# Patient Record
Sex: FEMALE | Race: WHITE | NOT HISPANIC OR LATINO | Employment: FULL TIME | URBAN - METROPOLITAN AREA
[De-identification: names, ages, dates, MRNs, and addresses within clinical notes are randomized per-mention and may not be internally consistent; named-entity substitution may affect disease eponyms.]

---

## 2018-07-02 ENCOUNTER — OFFICE VISIT (OUTPATIENT)
Dept: ORTHOPEDIC SURGERY | Facility: CLINIC | Age: 43
End: 2018-07-02

## 2018-07-02 VITALS — HEIGHT: 59 IN | BODY MASS INDEX: 32 KG/M2 | HEART RATE: 122 BPM | WEIGHT: 158.73 LBS | OXYGEN SATURATION: 98 %
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
## 2018-07-02 DIAGNOSIS — S92.354A CLOSED NONDISPLACED FRACTURE OF FIFTH METATARSAL BONE OF RIGHT FOOT, INITIAL ENCOUNTER: Primary | ICD-10-CM

## 2018-07-02 PROCEDURE — 99203 OFFICE O/P NEW LOW 30 MIN: CPT | Performed by: PHYSICIAN ASSISTANT

## 2018-07-02 RX ORDER — TIZANIDINE 4 MG/1
TABLET ORAL
Refills: 0 | COMMUNITY
Start: 2018-04-10 | End: 2018-07-02

## 2018-07-02 NOTE — PROGRESS NOTES
[facility] [place] [facility] Note    Subjective     Pain and Edema of the Right Foot      HPI    Claribel Phillips is a 43 y.o. female. Patient presents to orthopedic clinic for evaluation of right foot pain.  States that she fell last Thursday (6/28/18).  Secondary to pain, she presented to urgent care the next day, where she was diagnosed with a fifth metatarsal fracture, placed in a short leg boot with crutches and referred to orthopedics for further evaluation and treatment.  Patient stated that when she was 8 or 9 years old she did have a right distal fibular fracture that was casted.  No sequelae following its treatment.  She denies any numbness or tingling into the extremity.  Currently reports pain scale 8/10.  Severity of pain moderate to severe.  Quality pain aching, burning, sharp, associated symptoms swelling.  Worsens with walking, stair climbing.  She denies any fever, chills, night sweats or other constitutional symptoms.  Using Tylenol for pain control is not helping.  Unable to take anti-inflammatories secondary to history of bariatric surgery.    Past Medical History:   Diagnosis Date   • Pancreatitis       Past Surgical History:   Procedure Laterality Date   • ABDOMINAL SURGERY      tummy tuck   • APPENDECTOMY     • CHOLECYSTECTOMY     • GASTRIC BYPASS     • HYSTERECTOMY     • TONSILLECTOMY        History reviewed. No pertinent family history.  Social History     Social History   • Marital status:      Spouse name: N/A   • Number of children: N/A   • Years of education: N/A     Occupational History   • Not on file.     Social History Main Topics   • Smoking status: Never Smoker   • Smokeless tobacco: Never Used   • Alcohol use No   • Drug use: No   • Sexual activity: Defer     Other Topics Concern   • Not on file     Social History Narrative   • No narrative on file      Current Outpatient Prescriptions on File Prior to Visit   Medication Sig Dispense Refill   • topiramate (TOPAMAX)  "25 MG capsule Take 25 mg by mouth 2 (Two) Times a Day.     • venlafaxine (EFFEXOR) 25 MG tablet Take 25 mg by mouth 2 (Two) Times a Day.       No current facility-administered medications on file prior to visit.       Allergies   Allergen Reactions   • Morphine And Related Itching     IV Morphine   • Nsaids GI Intolerance     History of gastric Bypass, unable to have NSAIDS        The following portions of the patient's history were reviewed and updated as appropriate: allergies, current medications, past family history, past medical history, past social history, past surgical history and problem list.    Review of Systems   Constitutional: Negative.    HENT: Negative.    Eyes: Negative.    Respiratory: Negative.    Cardiovascular: Negative.    Gastrointestinal: Negative.    Endocrine: Negative.    Genitourinary: Negative.    Musculoskeletal: Positive for arthralgias, gait problem and joint swelling.   Skin: Negative.    Allergic/Immunologic: Negative.    Hematological: Negative.    Psychiatric/Behavioral: Negative.         Objective      Physical Exam  Pulse (!) 122   Ht 149.9 cm (59\")   Wt 72 kg (158 lb 11.7 oz)   SpO2 98%   BMI 32.06 kg/m²     Body mass index is 32.06 kg/m².    General  Mental Status - alert  General Appearance - cooperative, well groomed, not in acute distress  Orientation - Oriented X3  Build & Nutrition - well developed and well nourished  Posture - normal posture  Gait - Antalgic gait, using crutches     Integumentary  Global Assessment  Examination of related systems reveals - no lymphadenopathy  General Characteristics  Overall examination of the patient's skin reveals - no rashes, no evidence of scars, no suspicious lesions and no bruises.  Color - normal coloration of skin except at right foot and ankle there is ecchymosis mostly along the lateral border of the foot and ankle.    Ortho Exam  Peripheral Vascular:  Lower Extremity:  Inspection:  Right--rapid capillary refill  Palpation: "  Dorsalis pedis pulse:   Right--normal    Neurologic  Sensory:    Light Touch:     Right foot:  Dorsal intact and plantar intact     Overall Assessment of Muscle Strength and Tone:  Lower Extremities:     Right:  Tibialis anterior--5/5    Gastroc soleus--5/5    EHL--5/5    FHL--5/5    Musculoskeletal  Lower Extremity  Ankle/Foot:  Inspection and Palpation:     Right:  Tenderness: Along the fifth metatarsal, lateral and dorsal foot    Swelling: Mild    Effusion:  None    Crepitus:  None     ROM:   Right:  Plantarflexion--30    Dorsiflexion--10    Inversion--5    Eversion--5    Instability:     Right:  Anterior drawer test--negative    Squeeze test--negative    Talar tilt test--negative      Imaging/Studies  Imaging Results (last 24 hours)     ** No results found for the last 24 hours. **      Reviewed plain film x-rays taken on 6/29/18.  Nondisplaced fracture noted proximal fifth metatarsal.  See chart for official report.    Assessment:  1. Closed nondisplaced fracture of fifth metatarsal bone of right foot, initial encounter    2. Fall, initial encounter        Plan:  1. Discussion was had with the patient regarding exam, imaging, assessment and treatment options.  2. Will place her in a right nonweightbearing short leg fiberglass cast today.  Reviewed cast precautions.  3. Discussed the importance that he stay nonweightbearing for use of her crutches but I also ordered her a knee scooter to help assist with this.  4. Continue use of her over-the-counter Tylenol however this doesn't provide enough pain relief she can contact her PCP for stronger medicine.  5. Note was given for work: Off today, needs to use crutches or scooter the rest of the time to remain.  6. Follow-up in 3 weeks for repeat evaluation this will include pre-clinic imaging of the right foot cast.  Return sooner if issues arise or symptoms change.  7. Questions and concerns answered.      Medical Decision Making  Management Options :  over-the-counter medicine and close treatment of fracture or dislocation  Data/Risk: radiology tests       Zoraida Singh PA-C  07/02/18  11:58 AM

## 2018-07-12 ENCOUNTER — TELEPHONE (OUTPATIENT)
Dept: ORTHOPEDIC SURGERY | Facility: CLINIC | Age: 43
End: 2018-07-12

## 2018-07-12 RX ORDER — HYDROCODONE BITARTRATE AND ACETAMINOPHEN 5; 325 MG/1; MG/1
1 TABLET ORAL EVERY 6 HOURS PRN
Qty: 30 TABLET | Refills: 0 | Status: SHIPPED | OUTPATIENT
Start: 2018-07-12

## 2018-07-12 NOTE — TELEPHONE ENCOUNTER
Please prescribe 30 tablets 5/325 Hydrocodone/acetaminophen for this patient. S/p R 5MT fracture.      Anamaria to contact patient.  No further hydrocodone after she goes to this prescription.

## 2018-07-12 NOTE — TELEPHONE ENCOUNTER
Patient saw her PCP for pain and was prescribed hydrocodone. She states she is out and her PCP said the patient needed to follow up with Ortho to get any more scripts for pain medication. She would like another script. Please call and advise.

## 2018-07-23 ENCOUNTER — OFFICE VISIT (OUTPATIENT)
Dept: ORTHOPEDIC SURGERY | Facility: CLINIC | Age: 43
End: 2018-07-23

## 2018-07-23 ENCOUNTER — TELEPHONE (OUTPATIENT)
Dept: ORTHOPEDIC SURGERY | Facility: CLINIC | Age: 43
End: 2018-07-23

## 2018-07-23 VITALS — HEIGHT: 59 IN | OXYGEN SATURATION: 99 % | WEIGHT: 158.73 LBS | HEART RATE: 94 BPM | BODY MASS INDEX: 32 KG/M2

## 2018-07-23 DIAGNOSIS — S92.354D CLOSED NONDISPLACED FRACTURE OF FIFTH METATARSAL BONE OF RIGHT FOOT WITH ROUTINE HEALING, SUBSEQUENT ENCOUNTER: Primary | ICD-10-CM

## 2018-07-23 PROCEDURE — 99213 OFFICE O/P EST LOW 20 MIN: CPT | Performed by: PHYSICIAN ASSISTANT

## 2018-07-23 PROCEDURE — 29405 APPL SHORT LEG CAST: CPT | Performed by: PHYSICIAN ASSISTANT

## 2018-07-23 NOTE — PROGRESS NOTES
"    [facility] – [place] [facility] Note    Subjective     CC: Follow-up of the Right Foot (3 weeks Closed nondisplaced fracture of fifth metatarsal bone of right foot)      MARTHA Phillips is a 43 y.o. female. Patient returns today 3-1/2 weeks status post a closed nondisplaced fracture of fifth metatarsal on the right foot.  She's been in a cast during this time.  She is using a knee scooter to remain nonweightbearing.  She currently reports her pain scale maximum 4/10.  Severity the pain moderate.  Quality pain aching and burning.  She denies any numbness or tingling into the extremity.  She was given a prescription of Wellman on 12 July for pain control management.  No other new complaints or issues at this time.      ROS:    Constiutional:Pt denies fever, chills, nausea, or vomiting.  MSK:as above    Objective      Past Medical History  Past Medical History:   Diagnosis Date   • Pancreatitis          Physical Exam  Pulse 94   Ht 149.9 cm (59.02\")   Wt 72 kg (158 lb 11.7 oz)   SpO2 99%   BMI 32.04 kg/m²     Body mass index is 32.04 kg/m².    Patient is well nourished and well developed.        Ortho Exam  Peripheral Vascular:  Lower Extremity:  Inspection:  Right--rapid capillary refill  Palpation:  Dorsalis pedis pulse:   Right--normal    Neurologic  Sensory:    Light Touch:     Right foot:  Dorsal intact and plantar intact       Overall Assessment of Muscle Strength and Tone:  Lower Extremities:     Right:  Tibialis anterior--demonstrates firing    Gastroc soleus--demonstrates firing    EHL--demonstrates firing    FHL--demonstrates firing      Musculoskeletal  Lower Extremity  Ankle/Foot:  Inspection and Palpation:     Right:  Tenderness: Base of fifth metatarsal    Swelling: Mild    Effusion:  None    Crepitus:  None       ROM:   Right:  Plantarflexion--30    Dorsiflexion--10    Inversion--5    Eversion--5      Imaging/Labs/EMG Reviewed:  Imaging Results (last 24 hours)     ** No results found for the " last 24 hours. **      Ordered plain films of the right foot today.  Images reviewed by Dr. Goetz.  Healing of a fifth metatarsal base fracture.  Joint spaces are preserved.  No abnormal soft tissue findings.  See chart for official report.    Assessment:  1. Closed nondisplaced fracture of fifth metatarsal bone of right foot with routine healing, subsequent encounter        Plan:  1. Discussion was had with patient regarding exam, imaging, assessment and treatment options.    2. Based on the amount of pain that she's having with palpation over the area will continue a short leg cast for an additional 2 weeks.  She was placed in a new short leg fiberglass cast today.  Once again reviewed cast precautions with her.  3. She'll continue use of her knee scooter to remain nonweightbearing.    4. Continue use of previously prescribed medication for pain control.  5. Follow-up in 2 weeks for repeat evaluation to include pre-clinic imaging of the right foot out of the cast.  Return sooner if issues arise or symptoms change.  6. Question and concerns answered.          Medical Decision Making  Management Options : over-the-counter medicine and close treatment of fracture or dislocation  Data/Risk: radiology tests       Zoraida Singh PA-C  07/24/18  12:18 PM

## 2018-07-24 RX ORDER — TRAMADOL HYDROCHLORIDE 50 MG/1
50 TABLET ORAL EVERY 4 HOURS PRN
Qty: 30 TABLET | Refills: 0 | Status: SHIPPED | OUTPATIENT
Start: 2018-07-24

## 2018-07-24 NOTE — TELEPHONE ENCOUNTER
Can no longer prescribed Norco.  She was prescribed tramadol 50mg, 30 tablets.  Dr. Jackson has sent this to her pharmacy.  After this, no further narcotics for pain management will be prescribed by our clinic.  She'll need to use acetaminophen/Tylenol.  If she does require something stronger, then she'll have to go through PCP for pain management.

## 2018-08-07 ENCOUNTER — OFFICE VISIT (OUTPATIENT)
Dept: ORTHOPEDIC SURGERY | Facility: CLINIC | Age: 43
End: 2018-08-07

## 2018-08-07 VITALS — HEIGHT: 59 IN | HEART RATE: 91 BPM | WEIGHT: 158.73 LBS | OXYGEN SATURATION: 99 % | BODY MASS INDEX: 32 KG/M2

## 2018-08-07 DIAGNOSIS — S92.354D CLOSED NONDISPLACED FRACTURE OF FIFTH METATARSAL BONE OF RIGHT FOOT WITH ROUTINE HEALING, SUBSEQUENT ENCOUNTER: Primary | ICD-10-CM

## 2018-08-07 PROCEDURE — 99212 OFFICE O/P EST SF 10 MIN: CPT | Performed by: PHYSICIAN ASSISTANT

## 2018-08-07 NOTE — PROGRESS NOTES
"    [facility] [place] [facility]    Subjective     Follow-up of the Right Foot (2 week follow up, Closed nondisplaced fracture of fifth metatarsal bone of right foot )       MARTHA Phillips is a 43 y.o. female.  Patient returns today for right foot fifth metatarsal base fracture.  She's been treated in a cast for a period of 5 weeks.  She was nonweightbearing for majority that time her scooter was returned and she's been now on crutches and her cast does show some wear to the bottom.  She reports that her pain is improved from her last visit.  Pain scale maximum 2-3/10.  Severity the pain mild.  Quality the pain aching with occasional sharpness.  Associated symptoms stiffness.  No reported radiculopathy or paresthesias.  Patient denies any fever, chills, night sweats or other constitutional symptoms.            Objective      Physical Exam  Pulse 91   Ht 149.9 cm (59.02\")   Wt 72 kg (158 lb 11.7 oz)   SpO2 99%   BMI 32.04 kg/m²     Body mass index is 32.04 kg/m².        Ortho Exam  Peripheral Vascular:  Lower Extremity:  Inspection:  Right--rapid capillary refill  Palpation:  Dorsalis pedis pulse:   Right--normal     Neurologic  Sensory:    Light Touch:                Right foot:  Dorsal intact and plantar intact                   Overall Assessment of Muscle Strength and Tone:  Lower Extremities:                Right:  Tibialis anterior- 4-/5                          Gastroc soleus- 4-/5                          EHL--5/5                          FHL--5/5      Musculoskeletal  Lower Extremity  Ankle/Foot:  Inspection and Palpation:                Right:  Tenderness: Base of fifth metatarsal                          Swelling: Mild                          Effusion:  None                          Crepitus:  None        ROM:              Right:  Plantarflexion--30                          Dorsiflexion--10                          Inversion--5                          " Eversion--5      Imaging/Studies  Imaging Results (last 24 hours)     Procedure Component Value Units Date/Time    XR Foot 3+ View Right [[id number]] Resulted:  08/07/18 1134     Updated:  08/07/18 1134    Narrative:       Indication: Right base of fifth metatarsal fracture    Comparison: Todays xrays were compared to previous xrays from 7/23/18       Impression:            Right foot 3 views: Radiographs M straight interval consolidation of the   base of fifth metatarsal fracture.  No change in overall alignment.      Ordered plain film imaging today of the right foot.  Images were reviewed by Dr. Jackson.  Evidence of interval consolidation noted to the base of fifth metatarsal fracture.  No change in overall alignment when compared to previous films.  See chart for official report.    Assessment:  1. Closed nondisplaced fracture of fifth metatarsal bone of right foot with routine healing, subsequent encounter        Plan:  1. Discussion was had with patient regarding exam, imaging, assessment and treatment options.  2. She'll transition today back to her boot.  3. I would still like her to remain nonweightbearing for at least another 2 weeks before resuming weightbearing in the boot.  4. May continue to use previous prescribed pain medication for pain control as needed.  If she doesn't need a prescribed medication then would recommend over-the-counter pain medication as needed.  5. Have her return to see me in 4 weeks for repeat evaluation to include pre-clinic imaging of the right foot out of the boot.  6. Question and concerns were answered.    Medical Decision Making  Management Options : over-the-counter medicine and close treatment of fracture or dislocation  Data/Risk: radiology tests       Zoraida Singh PA-C  08/07/18  3:51 PM

## 2018-09-04 ENCOUNTER — OFFICE VISIT (OUTPATIENT)
Dept: ORTHOPEDIC SURGERY | Facility: CLINIC | Age: 43
End: 2018-09-04

## 2018-09-04 DIAGNOSIS — M76.61 TENDONITIS, ACHILLES, RIGHT: ICD-10-CM

## 2018-09-04 DIAGNOSIS — S92.354D CLOSED NONDISPLACED FRACTURE OF FIFTH METATARSAL BONE OF RIGHT FOOT WITH ROUTINE HEALING, SUBSEQUENT ENCOUNTER: Primary | ICD-10-CM

## 2018-09-04 PROCEDURE — 99213 OFFICE O/P EST LOW 20 MIN: CPT | Performed by: PHYSICIAN ASSISTANT

## 2018-09-04 NOTE — PROGRESS NOTES
[facility] [place] [facility] Note    Subjective     Follow-up (4 week follow up, Closed nondisplaced fracture of fifth metatarsal bone of right foot)       MARTHA Phillips is a 43 y.o. female.  Patient returns today for follow-up evaluation of right foot fifth metatarsal base fracture.  Since her last appointment she's been in a cam boot and full weight bearing over the last 2 weeks.  She reports no pain at fracture site.  Her pain now is along the Achilles tendon which started with the casting and continued while wearing the boot.  She is currently wearing KT tape to the area to help assist with pain control and stabilization.  No reported numbness or tingling into the extremity.  Pain scale maximum to the fracture 0/10.  Pain scale maximum along the Achilles 4/10.  Severity the pain mild.  Quality the pain stabbing.  States pain is worse when she is flat-footed and pushing off during gait pattern.  No reported fever, chills, night sweats or other constitutional symptoms.        Objective      Physical Exam  There were no vitals taken for this visit.    There is no height or weight on file to calculate BMI.        Ortho Exam  Peripheral Vascular:  Lower Extremity:  Inspection:  Right--rapid capillary refill  Palpation:  Dorsalis pedis pulse:   Right--normal     Neurologic  Sensory:    Light Touch:                Right foot:  Dorsal intact and plantar intact     Overall Assessment of Muscle Strength and Tone:  Lower Extremities:                Right:  Tibialis anterior- 4/5                          Gastroc soleus- 4/5                          EHL--5/5                          FHL--5/5      Musculoskeletal  Lower Extremity  Ankle/Foot:  Inspection and Palpation:                Right:  Tenderness: Negative tenderness base of the fifth.  Positive tenderness Achilles tendon into its insertion                          Swelling: None                          Effusion:  None                          Crepitus:   None     Testing: Jeter's test negative Achilles is intact without evidence of nodules or defects present.     ROM:              Right:  Plantarflexion--40                          Dorsiflexion--10                          Inversion--5                          Eversion--5    NEURO: Heel Walking:  Right:  painful; Left:  normal    Toe Walking:  Right:  normal; Left:  normal     Lower extremity sensation: intact     Calf Atrophy:none      Imaging/Studies  Imaging Results (last 24 hours)     Procedure Component Value Units Date/Time    XR Foot 3+ View Right [[id number]] Resulted:  09/04/18 1018     Updated:  09/04/18 1019    Narrative:       Indication: Right base of fifth metatarsal fracture    Comparison: Todays xrays were compared to previous xrays from 817       Impression:            Right foot 3 views: Radiographs demonstrate interval consolidation of the   base of fifth metatarsal fracture with interval callus formation.    Fracture line remains visible but is less visible compared to prior   radiographs.          Ordered plain film imaging today of the right foot.  Images reviewed by Dr. Jackson.  Once again images demonstrate interval consolidation of the base the fifth metatarsal with interval callus.  Fracture lines less visible.  See chart for official report.      Assessment:  1. Closed nondisplaced fracture of fifth metatarsal bone of right foot with routine healing, subsequent encounter    2. Tendonitis, Achilles, right        Plan:  1. Discussion was had with patient regarding exam, imaging, assessment and treatment options.  2. She may transition to regular shoes.  3. Continue with KT tape as this helps her.  4. PT consult placed for the tendonitis.  5. Recommend over-the-counter pain medication as needed.  6. Have her return to see me in 6 weeks for repeat evaluation to include pre-clinic imaging of the right foot.  7. Question and concerns were answered.    Medical Decision Making  Management  Options : over-the-counter medicine and close treatment of fracture or dislocation  Data/Risk: radiology tests       Zoraida Singh PA-C  09/04/18  10:32 AM

## 2018-09-10 ENCOUNTER — HOSPITAL ENCOUNTER (OUTPATIENT)
Dept: PHYSICAL THERAPY | Facility: HOSPITAL | Age: 43
Setting detail: THERAPIES SERIES
Discharge: HOME | End: 2018-09-10

## 2018-09-10 DIAGNOSIS — S92.354S CLOSED NONDISPLACED FRACTURE OF FIFTH METATARSAL BONE OF RIGHT FOOT, SEQUELA: ICD-10-CM

## 2018-09-10 DIAGNOSIS — M76.61 ACHILLES TENDINITIS OF RIGHT LOWER EXTREMITY: Primary | ICD-10-CM

## 2018-09-10 PROCEDURE — 97161 PT EVAL LOW COMPLEX 20 MIN: CPT

## 2018-09-10 NOTE — THERAPY EVALUATION
Outpatient Physical Therapy Ortho Initial Evaluation  [facility]     Patient Name: Claribel Phillips  : 1975  MRN: [id number]  Today's Date: 9/10/2018      Visit Date: 09/10/2018    There is no problem list on file for this patient.       Past Medical History:   Diagnosis Date   • Pancreatitis         Past Surgical History:   Procedure Laterality Date   • ABDOMINAL SURGERY      tummy tuck   • APPENDECTOMY     • CHOLECYSTECTOMY     • GASTRIC BYPASS     • HYSTERECTOMY     • TONSILLECTOMY         Visit Dx:     ICD-10-CM ICD-9-CM   1. Achilles tendinitis of right lower extremity M76.61 726.71   2. Closed nondisplaced fracture of fifth metatarsal bone of right foot, sequela S92.354S 905.4           Patient History     Row Name 09/10/18 0818             History    Chief Complaint Pain  -      Type of Pain Ankle pain;Foot pain  -      Date Current Problem(s) Began 18  -      Brief Description of Current Complaint Pt stepped out of garage into yard and slipped off of step into yard and twisted foot into inversion.  Was put into cast  and wore it for 4 weeks, then wore a boot for 4 weeks. No pain in heels, but Achilles tendon hurts in tennis shoes and flats.  No pain at fracture site currently, only in Achilles.  -AC      Previous treatment for THIS PROBLEM Medication   Pain meds initially; now Tylenol and Aleve  -      Patient/Caregiver Goals Relieve pain;Return to prior level of function  -      Patient's Rating of General Health Good  -      Occupation/sports/leisure activities Pt runs a  business.  Pt enjoys walking, hiking, and going to dog park (unable to do at this time).  -AC      Patient seeing anyone else for problem(s)? Zoraida Singh  -      How has patient tried to help current problem? Some exercises and KT tape, heat/ice, massage; ice didn't help much  -      What clinical tests have you had for this problem? X-ray  -      Results of Clinical Tests Fifth  metatarsal nondisplaced fracture  -AC      Surgery/Hospitalization None recently  -AC      History of Previous Related Injuries Broke R foot when 8 years old  -AC         Pain     Pain Location Ankle  -AC      Pain at Present 4   0 at rest, 4 when walking  -AC      Pain at Best 0  -AC      Pain at Worst 7  -AC      Pain Frequency Intermittent  -AC      Pain Description Sharp;Stabbing   In heel and along tendon  -AC      What Performance Factors Make the Current Problem(s) WORSE? Walking 4-5 minutes, up/down steps  -AC      What Performance Factors Make the Current Problem(s) BETTER? Sitting and resting  -AC      Is your sleep disturbed? No  -AC      Difficulties at work? None  -AC      Difficulties with ADL's? Walking at work and in home  -AC         Fall Risk Assessment    Any falls in the past year: Yes  -AC      Number of falls reported in the last 12 months 1  -AC      Factors that contributed to the fall: Tripped   Tripped down steps while wearing cast; no injuries  -AC      Does patient have a fear of falling No  -AC         Daily Activities    Primary Language English  -AC      How does patient learn best? Listening;Reading;Demonstration  -AC      Barriers to learning Visual   Wears contacts  -AC      Pt Participated in POC and Goals Yes  -AC         Safety    Are you being hurt, hit, or frightened by anyone at home or in your life? No  -AC      Are you being neglected by a caregiver No  -AC        User Key  (r) = Recorded By, (t) = Taken By, (c) = Cosigned By    Initials Name Provider Type    AC Rosmery Wilder, PT Physical Therapist              PT Ortho     Row Name 09/10/18 0818       Posture/Observations    Posture- WNL Posture is WNL  -AC       Quarter Clearing    Quarter Clearing Lower Quarter Clearing  -AC       DTR- Lower Quarter Clearing    Patellar tendon (L2-4) Bilateral:;1- Minimal response  -AC    Achilles tendon (S1-2) Bilateral:;2- Normal response  -AC       Sensory Screen for Light  Touch- Lower Quarter Clearing    L2 (anterior mid thigh) Bilateral:;Intact  -AC    L3 (distal anterior thigh) Bilateral:;Intact  -AC    L4 (medial lower leg/foot) Bilateral:;Intact  -AC    L5 (lateral lower leg/great toe) Bilateral:;Intact  -AC    S1 (bottom of foot) Bilateral:;Intact  -AC       Myotomal Screen- Lower Quarter Clearing    Hip flexion (L2) Right:;4- (Good -);Left:;4 (Good)  -AC    Knee extension (L3) Bilateral:;4+ (Good +)  -AC    Ankle DF (L4) Right:;4+ (Good +);Left:;5 (Normal)  -AC    Great toe extension (L5) Bilateral:;5 (Normal)  -AC    Ankle PF (S1) Right:;4+ (Good +);Left:;5 (Normal)   Decr'd ht RLE but 25 reps, incr'd inversion with PF  -AC    Knee flexion (S2) Right:;4+ (Good +);Left:;5 (Normal)  -AC       Special Tests/Palpation    Special Tests/Palpation Ankle/Foot  -AC       Foot/Ankle Palpation    Foot/Ankle Palpation? Yes   Mild swelling noted along R mid-Achilles tendon  -AC       General ROM    RT Lower Ext Rt Ankle Dorsiflexion;Rt Ankle Plantarflexion;Rt Ankle Inversion;Rt Ankle Eversion  -AC    LT Lower Ext Lt Ankle Dorsiflexion;Lt Ankle Plantarflexion;Lt Ankle Inversion;Lt Ankle Eversion  -AC       Right Lower Ext    Rt Ankle Dorsiflexion AROM 10  -AC    Rt Ankle Plantarflexion AROM 60  -AC    Rt Ankle Inversion AROM 28  -AC    Rt Ankle Eversion AROM 27  -AC       Left Lower Ext    Lt Ankle Dorsiflexion AROM 8  -AC    Lt Ankle Plantarflexion AROM 43  -AC    Lt Ankle Inversion AROM 33  -AC    Lt Ankle Eversion AROM 30  -AC       MMT (Manual Muscle Testing)    Rt Lower Ext Rt Ankle Subtalar Inversion;Rt Ankle Subtalar Eversion  -AC    Lt Lower Ext Lt Ankle Subtalar Inversion;Lt Ankle Subtalar Eversion  -AC       MMT Right Lower Ext    Rt Ankle Subtalar Inversion MT, Gross Movement (4+/5) good plus  -AC    Rt Ankle Subtalar Eversion MMT, Gross Movement (4/5) good  -AC       MMT Left Lower Ext    Lt Ankle Subtalar Inversion MMT, Gross Movement (4+/5) good plus  -AC    Lt Ankle Subtalar  Eversion MMT, Gross Movement (4+/5) good plus  -AC       Gait/Stairs Assessment/Training    Deviations/Abnormal Patterns (Gait) antalgic;baldomero decreased   Slightly decr'd LLE step length  -      User Key  (r) = Recorded By, (t) = Taken By, (c) = Cosigned By    Initials Name Provider Type    AC Rosmery Wilder, PT Physical Therapist        Therapy Education  Education Details: HEP provided includin-way ankle (blue TB provided), heel raises (uni/bilateral), three way steps, step ups/step downs.  Given: HEP  Program: New  How Provided: Verbal, Demonstration, Written  Provided to: Patient  Level of Understanding: Verbalized           PT OP Goals     Row Name 09/10/18 0818          PT Short Term Goals    STG Date to Achieve 10/01/18  -     STG 1 Decrease R ankle pain with walking/steps by > or = 50%.  -AC     STG 1 Progress New  -     STG 2 Perform independent HEP to increase R ankle strength/stability and decrease pain.  -     STG 2 Progress New  -     STG 3 Enable ambulation up/down stairs with step over step pattern with < or = 3/10 painl.  -     STG 3 Progress New  -        Long Term Goals    LTG Date to Achieve 10/22/18  -AC     LTG 1 Decrease overall R ankle pain by > or = 75%.  -AC     LTG 1 Progress New  -     LTG 2 Improve LEFS score to > or = 65/80 to demonstrate significant improvement in use of RLE for ADLs/functional mobility.  -     LTG 2 Progress New  -     LTG 3 Enable pain-free ambulation up/down steps with step-over-step pattern.  -     LTG 3 Progress New  -        Time Calculation    PT Goal Re-Cert Due Date 18  -       User Key  (r) = Recorded By, (t) = Taken By, (c) = Cosigned By    Initials Name Provider Type    Rosmery Dailey, PT Physical Therapist              PT Assessment/Plan     Row Name 09/10/18 0818          PT Assessment    Functional Limitations Impaired gait;Limitations in community activities;Performance in work activities;Performance in  leisure activities  -AC     Impairments Pain;Muscle strength;Poor body mechanics;Gait  -AC     Assessment Comments Pt presents with signs and symptoms consistent with diagnosis of Achilles tendonitis.  Pt presents with overall good strength and ROM, with some impairments in body mechanics and pain with mobility for ADLs/work.  Pt would benefit from PT intervention to increase strength, decrease pain, and improve tolerance to activity.  -AC     Please refer to paper survey for additional self-reported information Yes  -AC     Rehab Potential Good  -AC     Patient/caregiver participated in establishment of treatment plan and goals Yes  -AC     Patient would benefit from skilled therapy intervention Yes  -AC        PT Plan    PT Frequency 1x/week  -AC     Predicted Duration of Therapy Intervention (Therapy Eval) 12 visits  -AC     Planned CPT's? PT EVAL LOW COMPLEXITY: 81566;PT THER PROC EA 15 MIN: 81618;PT MANUAL THERAPY EA 15 MIN: 69194;PT GAIT TRAINING EA 15 MIN: 36617;PT ELECTRICAL STIM UNATTEND: ;PT IONTOPHORESIS EA 15 MIN: 42822;PT THER SUPP EA 15 MIN;PT THER MASS EA 15 MIN: 17045;PT HOT/COLD PACK WC NONMCARE: 30166;PT RE-EVAL: 62844;PT THER ACT EA 15 MIN: 65142;PT NEUROMUSC RE-EDUCATION EA 15 MIN: 30892;PT SELF CARE/HOME MGMT/TRAIN EA 15: 76301  -AC     PT Plan Comments Progress RLE strengthening and stability as tolerated.  Manual therapy and modalities as needed.  -AC       User Key  (r) = Recorded By, (t) = Taken By, (c) = Cosigned By    Initials Name Provider Type    AC Rosmery Wilder, PT Physical Therapist        Outcome Measure Options: Lower Extremity Functional Scale (LEFS)  Lower Extremity Functional Index  Any of your usual work, housework or school activities: A little bit of difficulty  Your usual hobbies, recreational or sporting activities: A little bit of difficulty  Getting into or out of the bath: No difficulty  Walking between rooms: A little bit of difficulty  Putting on your shoes  or socks: No difficulty  Squatting: No difficulty  Lifting an object, like a bag of groceries from the floor: No difficulty  Performing light activities around your home: A little bit of difficulty  Performing heavy activities around your home: A little bit of difficulty  Getting into or out of a car: A little bit of difficulty  Walking 2 blocks: Moderate difficulty  Walking a mile: Moderate difficulty  Going up or down 10 stairs (about 1 flight of stairs): Quite a bit of difficulty  Standing for 1 hour: Quite a bit of difficulty  Sitting for 1 hour: No difficulty  Running on even ground: Quite a bit of difficulty  Running on uneven ground: Quite a bit of difficulty  Making sharp turns while running fast: A little bit of difficulty  Hopping: Quite a bit of difficulty  Rolling over in bed: No difficulty  Total: 54    Time Calculation:     Therapy Suggested Charges     Code   Minutes Charges    None             Start Time: 0818     Therapy Charges for Today     Code Description Service Date Service Provider Modifiers Qty    [id number] HC PT EVAL LOW COMPLEXITY 4 9/10/2018 Rosmery Wilder, PT GP 1          PT G-Codes  Outcome Measure Options: Lower Extremity Functional Scale (LEFS)  Total: 54       Rosmery Wilder PT  9/10/2018

## 2018-09-18 ENCOUNTER — APPOINTMENT (OUTPATIENT)
Dept: PHYSICAL THERAPY | Facility: HOSPITAL | Age: 43
End: 2018-09-18

## 2018-10-10 ENCOUNTER — APPOINTMENT (OUTPATIENT)
Dept: PHYSICAL THERAPY | Facility: HOSPITAL | Age: 43
End: 2018-10-10